# Patient Record
Sex: MALE | Race: WHITE | NOT HISPANIC OR LATINO | Employment: OTHER | URBAN - NONMETROPOLITAN AREA
[De-identification: names, ages, dates, MRNs, and addresses within clinical notes are randomized per-mention and may not be internally consistent; named-entity substitution may affect disease eponyms.]

---

## 2017-03-08 ENCOUNTER — HOSPITAL ENCOUNTER (OUTPATIENT)
Dept: GENERAL RADIOLOGY | Facility: HOSPITAL | Age: 57
Discharge: HOME | End: 2017-03-08
Admitting: PHYSICIAN ASSISTANT

## 2017-03-08 ENCOUNTER — TRANSCRIBE ORDERS (OUTPATIENT)
Dept: ADMINISTRATIVE | Facility: HOSPITAL | Age: 57
End: 2017-03-08

## 2017-03-08 DIAGNOSIS — R05.9 COUGH: Primary | ICD-10-CM

## 2017-03-08 PROCEDURE — 71020 HC CHEST PA AND LATERAL: CPT

## 2018-05-10 ENCOUNTER — TRANSCRIBE ORDERS (OUTPATIENT)
Dept: ADMINISTRATIVE | Facility: HOSPITAL | Age: 58
End: 2018-05-10

## 2018-05-10 DIAGNOSIS — R53.83 OTHER FATIGUE: Primary | ICD-10-CM

## 2018-05-10 DIAGNOSIS — R06.83 SNORING: ICD-10-CM

## 2018-06-13 ENCOUNTER — HOSPITAL ENCOUNTER (OUTPATIENT)
Dept: SLEEP MEDICINE | Facility: HOSPITAL | Age: 58
Discharge: HOME | End: 2018-06-13
Attending: INTERNAL MEDICINE | Admitting: INTERNAL MEDICINE

## 2018-06-13 VITALS
HEIGHT: 69 IN | RESPIRATION RATE: 16 BRPM | SYSTOLIC BLOOD PRESSURE: 149 MMHG | DIASTOLIC BLOOD PRESSURE: 69 MMHG | WEIGHT: 208 LBS | OXYGEN SATURATION: 97 % | HEART RATE: 72 BPM | BODY MASS INDEX: 30.81 KG/M2

## 2018-06-13 DIAGNOSIS — R06.83 SNORING: ICD-10-CM

## 2018-06-13 DIAGNOSIS — R53.83 OTHER FATIGUE: ICD-10-CM

## 2018-06-13 PROCEDURE — 95810 POLYSOM 6/> YRS 4/> PARAM: CPT | Performed by: PSYCHIATRY & NEUROLOGY

## 2018-06-13 PROCEDURE — 95810 POLYSOM 6/> YRS 4/> PARAM: CPT

## 2018-06-13 RX ORDER — MELOXICAM 15 MG/1
15 TABLET ORAL DAILY
COMMUNITY

## 2018-06-13 RX ORDER — LISINOPRIL 10 MG/1
10 TABLET ORAL DAILY
COMMUNITY

## 2018-06-13 RX ORDER — CHLORAL HYDRATE 500 MG
CAPSULE ORAL
COMMUNITY

## 2018-06-13 RX ORDER — FENOFIBRATE 145 MG/1
145 TABLET, COATED ORAL DAILY
COMMUNITY

## 2018-06-13 RX ORDER — INSULIN GLARGINE 100 [IU]/ML
INJECTION, SOLUTION SUBCUTANEOUS DAILY
COMMUNITY
End: 2019-07-11 | Stop reason: ALTCHOICE

## 2018-06-25 ENCOUNTER — HOSPITAL ENCOUNTER (OUTPATIENT)
Dept: SLEEP MEDICINE | Facility: HOSPITAL | Age: 58
Discharge: HOME | End: 2018-06-25
Admitting: PSYCHIATRY & NEUROLOGY

## 2018-06-25 ENCOUNTER — TRANSCRIBE ORDERS (OUTPATIENT)
Dept: SLEEP MEDICINE | Facility: HOSPITAL | Age: 58
End: 2018-06-25

## 2018-06-25 VITALS
BODY MASS INDEX: 30.79 KG/M2 | SYSTOLIC BLOOD PRESSURE: 140 MMHG | RESPIRATION RATE: 18 BRPM | DIASTOLIC BLOOD PRESSURE: 84 MMHG | WEIGHT: 207.89 LBS | HEART RATE: 92 BPM | HEIGHT: 69 IN | OXYGEN SATURATION: 96 %

## 2018-06-25 DIAGNOSIS — G47.33 OBSTRUCTIVE SLEEP APNEA, ADULT: Primary | ICD-10-CM

## 2018-06-25 DIAGNOSIS — G47.33 OBSTRUCTIVE SLEEP APNEA, ADULT: ICD-10-CM

## 2018-06-25 PROCEDURE — 95811 POLYSOM 6/>YRS CPAP 4/> PARM: CPT | Performed by: PSYCHIATRY & NEUROLOGY

## 2018-06-25 PROCEDURE — 95811 POLYSOM 6/>YRS CPAP 4/> PARM: CPT

## 2019-02-13 ENCOUNTER — TRANSCRIBE ORDERS (OUTPATIENT)
Dept: ADMINISTRATIVE | Facility: HOSPITAL | Age: 59
End: 2019-02-13

## 2019-02-13 ENCOUNTER — HOSPITAL ENCOUNTER (OUTPATIENT)
Dept: GENERAL RADIOLOGY | Facility: HOSPITAL | Age: 59
Discharge: HOME | End: 2019-02-13
Admitting: INTERNAL MEDICINE

## 2019-02-13 DIAGNOSIS — M25.511 RIGHT SHOULDER PAIN, UNSPECIFIED CHRONICITY: Primary | ICD-10-CM

## 2019-02-13 PROCEDURE — 73030 X-RAY EXAM OF SHOULDER: CPT

## 2019-02-14 ENCOUNTER — HOSPITAL ENCOUNTER (OUTPATIENT)
Dept: MRI IMAGING | Facility: HOSPITAL | Age: 59
Discharge: HOME | End: 2019-02-14
Admitting: PHYSICIAN ASSISTANT

## 2019-02-14 ENCOUNTER — TRANSCRIBE ORDERS (OUTPATIENT)
Dept: ADMINISTRATIVE | Facility: HOSPITAL | Age: 59
End: 2019-02-14

## 2019-02-14 DIAGNOSIS — M25.511 RIGHT SHOULDER PAIN, UNSPECIFIED CHRONICITY: Primary | ICD-10-CM

## 2019-02-14 PROCEDURE — 73221 MRI JOINT UPR EXTREM W/O DYE: CPT

## 2019-07-11 ENCOUNTER — OFFICE VISIT (OUTPATIENT)
Dept: GASTROENTEROLOGY | Facility: CLINIC | Age: 59
End: 2019-07-11

## 2019-07-11 VITALS
HEART RATE: 85 BPM | DIASTOLIC BLOOD PRESSURE: 72 MMHG | BODY MASS INDEX: 29.78 KG/M2 | HEIGHT: 70 IN | SYSTOLIC BLOOD PRESSURE: 128 MMHG | OXYGEN SATURATION: 97 % | WEIGHT: 208 LBS

## 2019-07-11 DIAGNOSIS — Z80.0 FAMILY HX OF COLON CANCER: ICD-10-CM

## 2019-07-11 DIAGNOSIS — E11.9 CONTROLLED TYPE 2 DIABETES MELLITUS WITHOUT COMPLICATION, WITHOUT LONG-TERM CURRENT USE OF INSULIN (HCC): ICD-10-CM

## 2019-07-11 DIAGNOSIS — I10 HTN (HYPERTENSION), BENIGN: ICD-10-CM

## 2019-07-11 DIAGNOSIS — Z86.010 HX OF COLONIC POLYP: Primary | ICD-10-CM

## 2019-07-11 PROBLEM — Z86.0100 HX OF COLONIC POLYP: Status: ACTIVE | Noted: 2019-07-11

## 2019-07-11 PROCEDURE — S0285 CNSLT BEFORE SCREEN COLONOSC: HCPCS | Performed by: CLINICAL NURSE SPECIALIST

## 2019-07-11 RX ORDER — SODIUM, POTASSIUM,MAG SULFATES 17.5-3.13G
SOLUTION, RECONSTITUTED, ORAL ORAL
Qty: 2 BOTTLE | Refills: 0 | Status: ON HOLD | OUTPATIENT
Start: 2019-07-11 | End: 2019-10-22

## 2019-07-11 NOTE — PROGRESS NOTES
Juan Antonio Gomes  1960 7/11/2019  Chief Complaint   Patient presents with   • Colonoscopy     Subjective   HPI  Juan Antonio Gomes is a 59 y.o. male who presents as a referral for preventative maintenance. He has no complaints of nausea or vomiting. No change in bowels. No wt loss. No BRBPR. No melena. There is positive family hx for colon cancer. No abdominal pain.  Past Medical History:   Diagnosis Date   • Diabetes mellitus (CMS/HCC)    • Hx of colonic polyp    • Hyperlipidemia      Past Surgical History:   Procedure Laterality Date   • ACHILLES TENDON SURGERY     • COLONOSCOPY  08/15/2014    Normal exam repeat in 5 years   • COLONOSCOPY W/ POLYPECTOMY  08/29/2011    Hyperplastic polyp at 60 cm repeat exam in 3 years   • REFRACTIVE SURGERY     • VASECTOMY       Outpatient Medications Marked as Taking for the 7/11/19 encounter (Office Visit) with Jessica Lai APRN   Medication Sig Dispense Refill   • dapagliflozin (FARXIGA) 5 MG tablet tablet Take 5 mg by mouth Daily.     • Dulaglutide (TRULICITY) 1.5 MG/0.5ML solution pen-injector Inject  under the skin into the appropriate area as directed. weekly     • fenofibrate (TRICOR) 145 MG tablet Take 145 mg by mouth Daily.     • insulin detemir (LEVEMIR) 100 UNIT/ML injection Inject 42 Units under the skin into the appropriate area as directed Daily.     • lisinopril (PRINIVIL,ZESTRIL) 10 MG tablet Take 10 mg by mouth Daily.     • meloxicam (MOBIC) 15 MG tablet Take 15 mg by mouth Daily.     • metFORMIN (GLUCOPHAGE) 500 MG tablet Take 500 mg by mouth 4 (Four) Times a Day With Meals & at Bedtime.     • Omega-3 Fatty Acids (FISH OIL) 1000 MG capsule capsule Take  by mouth Daily With Breakfast.       No Known Allergies  Social History     Socioeconomic History   • Marital status:      Spouse name: Not on file   • Number of children: Not on file   • Years of education: Not on file   • Highest education level: Not on file   Tobacco Use   • Smoking  "status: Former Smoker   • Smokeless tobacco: Never Used   Substance and Sexual Activity   • Alcohol use: Yes     Comment: Occasional     Family History   Problem Relation Age of Onset   • Colon cancer Father    • Colon polyps Neg Hx      Health Maintenance   Topic Date Due   • URINE MICROALBUMIN  1960   • ANNUAL PHYSICAL  05/05/1961   • PNEUMOCOCCAL VACCINE (19-64 MEDIUM RISK) (1 of 1 - PPSV23) 05/05/1977   • TDAP/TD VACCINES (1 - Tdap) 05/05/1977   • ZOSTER VACCINE (1 of 2) 05/05/2008   • HEPATITIS C SCREENING  04/30/2019   • DIABETIC FOOT EXAM  04/30/2019   • DIABETIC EYE EXAM  04/30/2019   • LIPID PANEL  07/11/2019   • INFLUENZA VACCINE  08/01/2019   • HEMOGLOBIN A1C  11/15/2019   • COLONOSCOPY  08/15/2024       REVIEW OF SYSTEMS  General: well appearing, no fever chills or sweats, no unexplained wt loss  HEENT: no acute visual or hearing disturbances  Cardiovascular: No chest pain or palpitations  Pulmonary: No shortness of breath, coughing, wheezing or hemoptysis  : No burning, urgency, hematuria, or dysuria  Musculoskeletal: No joint pain or stiffness  Peripheral: no edema  Skin: No lesions or rashes  Neuro: No dizziness, headaches, stroke, syncope  Endocrine: No hot or cold intolerances  Hematological: No blood dyscrasias    Objective   Vitals:    07/11/19 1338   BP: 128/72   Pulse: 85   SpO2: 97%   Weight: 94.3 kg (208 lb)   Height: 177.8 cm (70\")     Body mass index is 29.84 kg/m².  Patient's Body mass index is 29.84 kg/m². BMI is above normal parameters. Recommendations include: nutrition counseling.      PHYSICAL EXAM  General: age appropriate well nourished well appearing, no acute distress  Head: normocephalic and atraumatic  Global assessment-supple  Neck-No JVD noted, no lymphadenopathy  Pulmonary-clear to auscultation bilaterally, normal respiratory effort  Cardiovascular-normal rate and rhythm, normal heart sounds, S1 and S2 noted  Abdomen-soft, non tender, non distended, normal bowel sounds " all 4 quadrants, no hepatosplenomegaly noted  Extremities-No clubbing cyanosis or edema  Neuro-Non focal, converses appropriately, awake, alert, oriented    Assessment/Plan     Juan Antonio was seen today for colonoscopy.    Diagnoses and all orders for this visit:    Hx of colonic polyp  -     Case Request; Standing  -     Follow Anesthesia Guidelines / Standing Orders; Future  -     Obtain Informed Consent; Future  -     Implement Anesthesia Orders Day of Procedure; Standing  -     Obtain Informed Consent; Standing  -     Verify bowel prep was successful; Standing  -     Case Request  -     SUPREP BOWEL PREP KIT 17.5-3.13-1.6 GM/177ML solution oral solution; Take as directed by office instructions provided    Controlled type 2 diabetes mellitus without complication, without long-term current use of insulin (CMS/[facility])  Comments:  Hold Metformin the am of procedure    HTN (hypertension), benign  Comments:  cont BP medication the day of procedure    Family hx of colon cancer        COLONOSCOPY WITH ANESTHESIA (N/A)  Body mass index is 29.84 kg/m².    Patient instructions on prep prior to procedure provided to the patient.    All risks, benefits, alternatives, and indications of colonoscopy procedure have been discussed with the patient. Risks to include perforation of the colon requiring possible surgery or colostomy, risk of bleeding from biopsies or removal of colon tissue, possibility of missing a colon polyp or cancer, or adverse drug reaction.  Benefits to include the diagnosis and management of disease of the colon and rectum. Alternatives to include barium enema, radiographic evaluation, lab testing or no intervention. Pt verbalizes understanding and agrees.     Jessica Lai, APRN  2019  1:57 PM      IF YOU SMOKE OR USE TOBACCO PLEASE READ THE FOLLOWIN minutes reading provided    Why is smoking bad for me?  Smoking increases the risk of heart disease, lung disease, vascular disease, stroke, and  cancer.     If you smoke, STOP!    If you would like more information on quitting smoking, please visit the [employer] website: [email or web address]   This link will provide additional resources including the QUIT line and the [employer] the Pack support groups.     For more information:    Quit Now [place]  1-800-QUIT-NOW  https://[email or web address].org/en-[place]/    Obesity, Adult  Obesity is the condition of having too much total body fat. Being overweight or obese means that your weight is greater than what is considered healthy for your body size. Obesity is determined by a measurement called BMI. BMI is an estimate of body fat and is calculated from height and weight. For adults, a BMI of 30 or higher is considered obese.  Obesity can eventually lead to other health concerns and major illnesses, including:  · Stroke.  · Coronary artery disease (CAD).  · Type 2 diabetes.  · Some types of cancer, including cancers of the colon, breast, uterus, and gallbladder.  · Osteoarthritis.  · High blood pressure (hypertension).  · High cholesterol.  · Sleep apnea.  · Gallbladder stones.  · Infertility problems.  What are the causes?  The main cause of obesity is taking in (consuming) more calories than your body uses for energy. Other factors that contribute to this condition may include:  · Being born with genes that make you more likely to become obese.  · Having a medical condition that causes obesity. These conditions include:  ¨ Hypothyroidism.  ¨ Polycystic ovarian syndrome (PCOS).  ¨ Binge-eating disorder.  ¨ Cushing syndrome.  · Taking certain medicines, such as steroids, antidepressants, and seizure medicines.  · Not being physically active (sedentary lifestyle).  · Living where there are limited places to exercise safely or buy healthy foods.  · Not getting enough sleep.  What increases the risk?  The following factors may increase your risk of this condition:  · Having a  family history of obesity.  · Being a woman of -American descent.  · Being a man of  descent.  What are the signs or symptoms?  Having excessive body fat is the main symptom of this condition.  How is this diagnosed?  This condition may be diagnosed based on:  · Your symptoms.  · Your medical history.  · A physical exam. Your health care provider may measure:  ¨ Your BMI. If you are an adult with a BMI between 25 and less than 30, you are considered overweight. If you are an adult with a BMI of 30 or higher, you are considered obese.  ¨ The distances around your hips and your waist (circumferences). These may be compared to each other to help diagnose your condition.  ¨ Your skinfold thickness. Your health care provider may gently pinch a fold of your skin and measure it.  How is this treated?  Treatment for this condition often includes changing your lifestyle. Treatment may include some or all of the following:  · Dietary changes. Work with your health care provider and a dietitian to set a weight-loss goal that is healthy and reasonable for you. Dietary changes may include eating:  ¨ Smaller portions. A portion size is the amount of a particular food that is healthy for you to eat at one time. This varies from person to person.  ¨ Low-calorie or low-fat options.  ¨ More whole grains, fruits, and vegetables.  · Regular physical activity. This may include aerobic activity (cardio) and strength training.  · Medicine to help you lose weight. Your health care provider may prescribe medicine if you are unable to lose 1 pound a week after 6 weeks of eating more healthily and doing more physical activity.  · Surgery. Surgical options may include gastric banding and gastric bypass. Surgery may be done if:  ¨ Other treatments have not helped to improve your condition.  ¨ You have a BMI of 40 or higher.  ¨ You have life-threatening health problems related to obesity.  Follow these instructions at home:      Eating and drinking     · Follow recommendations from your health care provider about what you eat and drink. Your health care provider may advise you to:  ¨ Limit fast foods, sweets, and processed snack foods.  ¨ Choose low-fat options, such as low-fat milk instead of whole milk.  ¨ Eat 5 or more servings of fruits or vegetables every day.  ¨ Eat at home more often. This gives you more control over what you eat.  ¨ Choose healthy foods when you eat out.  ¨ Learn what a healthy portion size is.  ¨ Keep low-fat snacks on hand.  ¨ Avoid sugary drinks, such as soda, fruit juice, iced tea sweetened with sugar, and flavored milk.  ¨ Eat a healthy breakfast.  · Drink enough water to keep your urine clear or pale yellow.  · Do not go without eating for long periods of time (do not fast) or follow a fad diet. Fasting and fad diets can be unhealthy and even dangerous.  Physical Activity   · Exercise regularly, as told by your health care provider. Ask your health care provider what types of exercise are safe for you and how often you should exercise.  · Warm up and stretch before being active.  · Cool down and stretch after being active.  · Rest between periods of activity.  Lifestyle   · Limit the time that you spend in front of your TV, computer, or video game system.  · Find ways to reward yourself that do not involve food.  · Limit alcohol intake to no more than 1 drink a day for nonpregnant women and 2 drinks a day for men. One drink equals 12 oz of beer, 5 oz of wine, or 1½ oz of hard liquor.  General instructions   · Keep a weight loss journal to keep track of the food you eat and how much you exercise you get.  · Take over-the-counter and prescription medicines only as told by your health care provider.  · Take vitamins and supplements only as told by your health care provider.  · Consider joining a support group. Your health care provider may be able to recommend a support group.  · Keep all follow-up visits as  told by your health care provider. This is important.  Contact a health care provider if:  · You are unable to meet your weight loss goal after 6 weeks of dietary and lifestyle changes.  This information is not intended to replace advice given to you by your health care provider. Make sure you discuss any questions you have with your health care provider.  Document Released: 01/25/2006 Document Revised: 05/22/2017 Document Reviewed: 10/05/2016  [employer] Interactive Patient Education © 2017 [employer]

## 2019-10-22 ENCOUNTER — ANESTHESIA (OUTPATIENT)
Dept: GASTROENTEROLOGY | Facility: HOSPITAL | Age: 59
End: 2019-10-22

## 2019-10-22 ENCOUNTER — ANESTHESIA EVENT (OUTPATIENT)
Dept: GASTROENTEROLOGY | Facility: HOSPITAL | Age: 59
End: 2019-10-22

## 2019-10-22 ENCOUNTER — HOSPITAL ENCOUNTER (OUTPATIENT)
Facility: HOSPITAL | Age: 59
Setting detail: HOSPITAL OUTPATIENT SURGERY
Discharge: HOME | End: 2019-10-22
Attending: INTERNAL MEDICINE | Admitting: INTERNAL MEDICINE

## 2019-10-22 VITALS
HEART RATE: 85 BPM | BODY MASS INDEX: 28.06 KG/M2 | WEIGHT: 196 LBS | DIASTOLIC BLOOD PRESSURE: 69 MMHG | RESPIRATION RATE: 18 BRPM | HEIGHT: 70 IN | TEMPERATURE: 96.7 F | OXYGEN SATURATION: 94 % | SYSTOLIC BLOOD PRESSURE: 135 MMHG

## 2019-10-22 DIAGNOSIS — Z86.010 HX OF COLONIC POLYP: ICD-10-CM

## 2019-10-22 LAB — GLUCOSE BLDC GLUCOMTR-MCNC: 121 MG/DL (ref 70–130)

## 2019-10-22 PROCEDURE — 93010 ELECTROCARDIOGRAM REPORT: CPT | Performed by: INTERNAL MEDICINE

## 2019-10-22 PROCEDURE — 93005 ELECTROCARDIOGRAM TRACING: CPT | Performed by: NURSE ANESTHETIST, CERTIFIED REGISTERED

## 2019-10-22 PROCEDURE — 82962 GLUCOSE BLOOD TEST: CPT

## 2019-10-22 PROCEDURE — 45385 COLONOSCOPY W/LESION REMOVAL: CPT | Performed by: INTERNAL MEDICINE

## 2019-10-22 PROCEDURE — 25010000002 PROPOFOL 10 MG/ML EMULSION: Performed by: NURSE ANESTHETIST, CERTIFIED REGISTERED

## 2019-10-22 PROCEDURE — 88305 TISSUE EXAM BY PATHOLOGIST: CPT | Performed by: INTERNAL MEDICINE

## 2019-10-22 RX ORDER — SODIUM CHLORIDE 9 MG/ML
500 INJECTION, SOLUTION INTRAVENOUS CONTINUOUS PRN
Status: DISCONTINUED | OUTPATIENT
Start: 2019-10-22 | End: 2019-10-22 | Stop reason: HOSPADM

## 2019-10-22 RX ORDER — SODIUM CHLORIDE 0.9 % (FLUSH) 0.9 %
10 SYRINGE (ML) INJECTION AS NEEDED
Status: DISCONTINUED | OUTPATIENT
Start: 2019-10-22 | End: 2019-10-22 | Stop reason: HOSPADM

## 2019-10-22 RX ORDER — PROPOFOL 10 MG/ML
VIAL (ML) INTRAVENOUS AS NEEDED
Status: DISCONTINUED | OUTPATIENT
Start: 2019-10-22 | End: 2019-10-22 | Stop reason: SURG

## 2019-10-22 RX ADMIN — SODIUM CHLORIDE 500 ML: 9 INJECTION, SOLUTION INTRAVENOUS at 07:19

## 2019-10-22 RX ADMIN — PROPOFOL 50 MG: 10 INJECTION, EMULSION INTRAVENOUS at 07:51

## 2019-10-22 RX ADMIN — PROPOFOL 50 MG: 10 INJECTION, EMULSION INTRAVENOUS at 07:55

## 2019-10-22 RX ADMIN — LIDOCAINE HYDROCHLORIDE 100 MG: 20 INJECTION, SOLUTION INTRAVENOUS at 07:47

## 2019-10-22 RX ADMIN — LIDOCAINE HYDROCHLORIDE 100 MG: 20 INJECTION, SOLUTION INTRAVENOUS at 07:48

## 2019-10-22 RX ADMIN — PROPOFOL 100 MG: 10 INJECTION, EMULSION INTRAVENOUS at 07:47

## 2019-10-22 NOTE — H&P
[facility] Gastroenterology  Pre Procedure History & Physical    Chief Complaint:   History of polyps    Subjective     HPI:   Here for colonoscopy.  History of polyps    Past Medical History:   Past Medical History:   Diagnosis Date   • Diabetes mellitus (CMS/HCC)    • Hx of colonic polyp    • Hyperlipidemia    • Hypertension        Past Surgical History:  Past Surgical History:   Procedure Laterality Date   • ACHILLES TENDON SURGERY     • COLONOSCOPY  08/15/2014    Normal exam repeat in 5 years   • COLONOSCOPY W/ POLYPECTOMY  08/29/2011    Hyperplastic polyp at 60 cm repeat exam in 3 years   • REFRACTIVE SURGERY     • VASECTOMY         Family History:  Family History   Problem Relation Age of Onset   • Colon cancer Father    • Colon polyps Neg Hx        Social History:   reports that he has quit smoking. He has never used smokeless tobacco. He reports that he drinks alcohol.    Medications:   Prior to Admission medications    Medication Sig Start Date End Date Taking? Authorizing Provider   dapagliflozin (FARXIGA) 5 MG tablet tablet Take 5 mg by mouth Daily.   Yes Zak Broussard MD   fenofibrate (TRICOR) 145 MG tablet Take 145 mg by mouth Daily.   Yes Zak Broussard MD   lisinopril (PRINIVIL,ZESTRIL) 10 MG tablet Take 10 mg by mouth Daily.   Yes Zak Broussard MD   meloxicam (MOBIC) 15 MG tablet Take 15 mg by mouth Daily.   Yes Zak Broussard MD   metFORMIN (GLUCOPHAGE) 500 MG tablet Take 500 mg by mouth 4 (Four) Times a Day With Meals & at Bedtime.   Yes Zak Broussard MD   Dulaglutide (TRULICITY) 1.5 MG/0.5ML solution pen-injector Inject  under the skin into the appropriate area as directed. weekly    Zak Broussard MD   insulin detemir (LEVEMIR) 100 UNIT/ML injection Inject 42 Units under the skin into the appropriate area as directed Daily.    Zak Broussard MD   Omega-3 Fatty Acids (FISH OIL) 1000 MG capsule capsule Take  by mouth Daily With Breakfast.     "Provider, MD Zak   SUPREP BOWEL PREP KIT 17.5-3.13-1.6 GM/177ML solution oral solution Take as directed by office instructions provided 7/11/19 10/22/19  Jessica Lai APRN       Allergies:  Penicillins    Objective     Blood pressure 171/53, pulse 50, temperature 96.7 °F (35.9 °C), temperature source Temporal, resp. rate 18, height 177.8 cm (70\"), weight 88.9 kg (196 lb), SpO2 99 %.    Physical Exam   Constitutional: Pt is oriented to person, place, and in no distress.   HENT: Mouth/Throat: Oropharynx is clear.   Cardiovascular: Normal rate, regular rhythm.    Pulmonary/Chest: Effort normal. No respiratory distress. No  wheezes.   Abdominal: Soft. Non-distended.  Skin: Skin is warm and dry.   Psychiatric: Mood, memory, affect and judgment appear normal.     Assessment/Plan     Diagnosis:  History of polyps    Anticipated Surgical Procedure:    Proceed with colonoscopy as scheduled    The following major R/B/A were discussed with the patient, however the list is not all inclusive . Risk:  Bleeding (immediate and delayed), perforation (rupture or tear), reaction to medication, missed lesion/cancer, pain during the procedure, infection, need for surgery, need for ostomy, need for mechanical ventilation (breathing machine), death.  Benefits: removal of polyp/tissue, burn/clip/or inject to stop bleeding, removal of foreign body, dilate any stricture.  Alternatives: Xray or CT, surgery, do nothing with associated risk   The patient was given time to ask question and received explanation, and agrees to proceed as per History and Physical.   No guarantee given or expressed.    EMR Dragon/transcription disclaimer: Much of this encounter note is an electronic transcription/translation of spoken language to printed text.  The electronic translation of spoken language may permit erroneous, or at times, nonsensical words or phrases to be inadvertently transcribed.  Although I have reviewed the note for such " errors, some may still exist.    Regulo Mandel MD  7:45 AM  10/22/2019

## 2019-10-22 NOTE — ANESTHESIA PREPROCEDURE EVALUATION
Anesthesia Evaluation     no history of anesthetic complications:  NPO Solid Status: > 8 hours  NPO Liquid Status: > 2 hours           Airway   Mallampati: I  TM distance: >3 FB  Neck ROM: full  No difficulty expected  Dental      Pulmonary    (+) sleep apnea on CPAP,   (-) asthma, not a smoker  Cardiovascular   Exercise tolerance: good (4-7 METS)    (+) hypertension, hyperlipidemia,       Neuro/Psych  (-) seizures, TIA, CVA  GI/Hepatic/Renal/Endo    (+)   diabetes mellitus type 2 using insulin,   (-) liver disease, no renal disease    Musculoskeletal     Abdominal    Substance History      OB/GYN          Other                        Anesthesia Plan    ASA 3     MAC     intravenous induction   Anesthetic plan, all risks, benefits, and alternatives have been provided, discussed and informed consent has been obtained with: patient.

## 2019-10-22 NOTE — ANESTHESIA POSTPROCEDURE EVALUATION
Patient: Juan Antonio Gomes    Procedure Summary     Date:  10/22/19 Room / Location:  [facility] ENDOSCOPY 5 / [facility]    Anesthesia Start:  0745 Anesthesia Stop:  0807    Procedure:  COLONOSCOPY WITH ANESTHESIA (N/A ) Diagnosis:       Hx of colonic polyp      (Hx of colonic polyp [Z86.010])    Surgeon:  Regulo Mandel MD Provider:  MADIHA Vides CRNA    Anesthesia Type:  MAC ASA Status:  3          Anesthesia Type: MAC  Last vitals  BP   171/53 (10/22/19 0656)   Temp   96.7 °F (35.9 °C) (10/22/19 0656)   Pulse   50 (10/22/19 0656)   Resp   18 (10/22/19 0656)     SpO2   99 % (10/22/19 0656)     Post Anesthesia Care and Evaluation    Patient location during evaluation: PACU  Patient participation: complete - patient participated  Level of consciousness: awake and alert  Pain score: 0  Pain management: adequate  Airway patency: patent  Anesthetic complications: No anesthetic complications    Cardiovascular status: acceptable and stable  Respiratory status: acceptable and unassisted  Hydration status: acceptable

## 2019-10-23 LAB
CYTO UR: NORMAL
LAB AP CASE REPORT: NORMAL
PATH REPORT.FINAL DX SPEC: NORMAL
PATH REPORT.GROSS SPEC: NORMAL

## 2019-11-20 ENCOUNTER — HOSPITAL ENCOUNTER (OUTPATIENT)
Dept: NON INVASIVE DIAGNOSTICS | Age: 59
Discharge: HOME | End: 2019-11-20
Payer: COMMERCIAL

## 2019-11-20 PROCEDURE — 93227 XTRNL ECG REC<48 HR R&I: CPT | Performed by: INTERNAL MEDICINE

## 2019-11-20 PROCEDURE — 93225 XTRNL ECG REC<48 HRS REC: CPT

## 2019-11-20 PROCEDURE — 93226 XTRNL ECG REC<48 HR SCAN A/R: CPT

## 2021-03-15 ENCOUNTER — APPOINTMENT (OUTPATIENT)
Dept: VACCINE CLINIC | Facility: HOSPITAL | Age: 61
End: 2021-03-15

## 2023-03-17 ENCOUNTER — TRANSCRIBE ORDERS (OUTPATIENT)
Dept: ADMINISTRATIVE | Facility: HOSPITAL | Age: 63
End: 2023-03-17
Payer: COMMERCIAL

## 2023-03-17 DIAGNOSIS — M79.606 PAIN OF LOWER EXTREMITY, UNSPECIFIED LATERALITY: Primary | ICD-10-CM

## 2024-10-22 ENCOUNTER — OFFICE VISIT (OUTPATIENT)
Dept: GASTROENTEROLOGY | Facility: CLINIC | Age: 64
End: 2024-10-22
Payer: MEDICARE

## 2024-10-22 VITALS
OXYGEN SATURATION: 96 % | HEART RATE: 84 BPM | TEMPERATURE: 97.7 F | DIASTOLIC BLOOD PRESSURE: 70 MMHG | HEIGHT: 70 IN | WEIGHT: 198 LBS | SYSTOLIC BLOOD PRESSURE: 130 MMHG | BODY MASS INDEX: 28.35 KG/M2

## 2024-10-22 DIAGNOSIS — Z78.9 NONSMOKER: ICD-10-CM

## 2024-10-22 DIAGNOSIS — Z86.0101 HX OF ADENOMATOUS COLONIC POLYPS: Primary | ICD-10-CM

## 2024-10-22 DIAGNOSIS — Z86.0102 PERSONAL HISTORY OF HYPERPLASTIC COLON POLYPS: ICD-10-CM

## 2024-10-22 DIAGNOSIS — E11.9 CONTROLLED TYPE 2 DIABETES MELLITUS WITHOUT COMPLICATION, WITHOUT LONG-TERM CURRENT USE OF INSULIN: ICD-10-CM

## 2024-10-22 DIAGNOSIS — I10 HTN (HYPERTENSION), BENIGN: ICD-10-CM

## 2024-10-22 DIAGNOSIS — R19.4 CHANGE IN BOWEL HABITS: ICD-10-CM

## 2024-10-22 PROCEDURE — 3078F DIAST BP <80 MM HG: CPT | Performed by: CLINICAL NURSE SPECIALIST

## 2024-10-22 PROCEDURE — 1159F MED LIST DOCD IN RCRD: CPT | Performed by: CLINICAL NURSE SPECIALIST

## 2024-10-22 PROCEDURE — 99203 OFFICE O/P NEW LOW 30 MIN: CPT | Performed by: CLINICAL NURSE SPECIALIST

## 2024-10-22 PROCEDURE — 3075F SYST BP GE 130 - 139MM HG: CPT | Performed by: CLINICAL NURSE SPECIALIST

## 2024-10-22 PROCEDURE — 1160F RVW MEDS BY RX/DR IN RCRD: CPT | Performed by: CLINICAL NURSE SPECIALIST

## 2024-10-22 RX ORDER — METOPROLOL SUCCINATE 50 MG/1
1 TABLET, EXTENDED RELEASE ORAL
COMMUNITY

## 2024-10-22 RX ORDER — SODIUM, POTASSIUM,MAG SULFATES 17.5-3.13G
SOLUTION, RECONSTITUTED, ORAL ORAL
Qty: 177 ML | Refills: 0 | Status: SHIPPED | OUTPATIENT
Start: 2024-10-22

## 2024-10-22 RX ORDER — INSULIN GLARGINE 300 U/ML
INJECTION, SOLUTION SUBCUTANEOUS
COMMUNITY

## 2024-10-22 RX ORDER — DOXEPIN HYDROCHLORIDE 25 MG/1
25 CAPSULE ORAL NIGHTLY
COMMUNITY

## 2024-10-22 RX ORDER — ATORVASTATIN CALCIUM 10 MG/1
1 TABLET, FILM COATED ORAL
COMMUNITY

## 2024-10-22 RX ORDER — HYDROCHLOROTHIAZIDE 12.5 MG/1
1 TABLET ORAL DAILY
COMMUNITY

## 2024-10-22 NOTE — PROGRESS NOTES
Juan Antonio Gomes  1960      10/22/2024  Chief Complaint   Patient presents with    GI Problem     Change in bowel-Colon recall-hx of polyps     Subjective   HPI  Juan Antonio Gomes is a 64 y.o. male who presents as a referral for preventative maintenance and change in bowels. He has no complaints of nausea or vomiting. He says he has started taking dulcolax on occasion. He says he does not feel he is emptying the way he normally does over the past few months. No certain triggers. No alleviating factors. He is on fiber. No wt loss. No BRBPR. No melena. There is positive family hx for colon cancer. No abdominal pain.    Past Medical History:   Diagnosis Date    Diabetes mellitus     Hx of colonic polyp     Hyperlipidemia     Hypertension      Past Surgical History:   Procedure Laterality Date    ACHILLES TENDON SURGERY      COLONOSCOPY  08/15/2014    Normal exam repeat in 5 years    COLONOSCOPY N/A 10/22/2019    Dr. teixeira-One 5 mm hyperplastic polyp at 70 cm proximal to the anus, One 4 mm tubular adenoma polyp at 60 cm proximal to the anus, One 4 mm hyperplastic polyp at 50 cm proximal to the anus    COLONOSCOPY W/ POLYPECTOMY  08/29/2011    Hyperplastic polyp at 60 cm repeat exam in 3 years    REFRACTIVE SURGERY      VASECTOMY       Outpatient Medications Marked as Taking for the 10/22/24 encounter (Office Visit) with Jessica Lai APRN   Medication Sig Dispense Refill    ASPIRIN 81 PO Take 1 tablet by mouth Daily.      atorvastatin (LIPITOR) 10 MG tablet Take 1 tablet by mouth every night at bedtime.      dapagliflozin (FARXIGA) 5 MG tablet tablet Take 1 tablet by mouth Daily.      doxepin (SINEquan) 25 MG capsule Take 1 capsule by mouth Every Night.      Dulaglutide (TRULICITY) 1.5 MG/0.5ML solution pen-injector Inject  under the skin into the appropriate area as directed. weekly      fenofibrate (TRICOR) 145 MG tablet Take 1 tablet by mouth Daily.      hydroCHLOROthiazide 12.5 MG tablet Take 1  tablet by mouth Daily.      lisinopril (PRINIVIL,ZESTRIL) 10 MG tablet Take 1 tablet by mouth Daily.      meloxicam (MOBIC) 15 MG tablet Take 1 tablet by mouth Daily.      metFORMIN (GLUCOPHAGE) 500 MG tablet Take 1 tablet by mouth 4 (Four) Times a Day With Meals & at Bedtime.      metoprolol succinate XL (TOPROL-XL) 50 MG 24 hr tablet Take 1 tablet by mouth every night at bedtime.      Omega-3 Fatty Acids (FISH OIL) 1000 MG capsule capsule Take  by mouth Daily With Breakfast.      Toujeo Max SoloStar 300 UNIT/ML solution pen-injector injection INJECT 42 UNITS SUBCUTANEOUSLY ONCE DAILY       Allergies   Allergen Reactions    Penicillins Unknown (See Comments)     childhood     Social History     Socioeconomic History    Marital status:    Tobacco Use    Smoking status: Former    Smokeless tobacco: Never   Substance and Sexual Activity    Alcohol use: Yes     Comment: Occasional    Drug use: Never     Family History   Problem Relation Age of Onset    Colon cancer Father     Colon polyps Neg Hx      Health Maintenance   Topic Date Due    URINE MICROALBUMIN  Never done    Pneumococcal Vaccine 65+ (1 of 2 - PCV) Never done    DIABETIC EYE EXAM  Never done    LIPID PANEL  03/11/2016    HEPATITIS C SCREENING  Never done    ANNUAL WELLNESS VISIT  Never done    DIABETIC FOOT EXAM  Never done    BMI FOLLOWUP  07/11/2020    HEMOGLOBIN A1C  08/21/2020    COLORECTAL CANCER SCREENING  10/22/2024    COVID-19 Vaccine (5 - 2023-24 season) 11/08/2024    TDAP/TD VACCINES (2 - Tdap) 09/09/2031    INFLUENZA VACCINE  Completed    ZOSTER VACCINE  Completed       REVIEW OF SYSTEMS  General: well appearing, no fever chills or sweats, no unexplained wt loss  HEENT: no acute visual or hearing disturbances  Cardiovascular: No chest pain or palpitations  Pulmonary: No shortness of breath, coughing, wheezing or hemoptysis  : No burning, urgency, hematuria, or dysuria  GI: more constipation, change in bowels.  Musculoskeletal: No joint  "pain or stiffness  Peripheral: no edema  Skin: No lesions or rashes  Neuro: No dizziness, headaches, stroke, syncope  Endocrine: No hot or cold intolerances  Hematological: No blood dyscrasias    Objective   Vitals:    10/22/24 1252   BP: 130/70   BP Location: Left arm   Pulse: 84   Temp: 97.7 °F (36.5 °C)   TempSrc: Temporal   SpO2: 96%   Weight: 89.8 kg (198 lb)   Height: 177.8 cm (70\")     Body mass index is 28.41 kg/m².  BMI cannot be calculated due to outdated height or weight values.  Please input a current height/weight in Vitals and re-renter BMIFOLLOWUP in Note to pull in correct documentation based on BMI range.      PHYSICAL EXAM  General: age appropriate well nourished well appearing, no acute distress  Head: normocephalic and atraumatic  Global assessment-supple  Neck-No JVD noted, no lymphadenopathy  Pulmonary-clear to auscultation bilaterally, normal respiratory effort  Cardiovascular-normal rate and rhythm, normal heart sounds, S1 and S2 noted  Abdomen-soft, non tender, non distended, normal bowel sounds all 4 quadrants, no hepatosplenomegaly noted  Extremities-No clubbing cyanosis or edema  Neuro-Non focal, converses appropriately, awake, alert, oriented    Assessment & Plan     Diagnoses and all orders for this visit:    1. Hx of adenomatous colonic polyps (Primary)  -     Case Request; Standing  -     Case Request  -     sodium-potassium-magnesium sulfates (Suprep Bowel Prep Kit) 17.5-3.13-1.6 GM/177ML solution oral solution; Take as directed by office instructions provided  Dispense: 177 mL; Refill: 0    2. Change in bowel habits    3. Personal history of hyperplastic colon polyps    4. Nonsmoker    5. HTN (hypertension), benign  Comments:  cont BP medication the day of procedure    6. Controlled type 2 diabetes mellitus without complication, without long-term current use of insulin    Other orders  -     Implement Anesthesia Orders Day of Procedure; Standing  -     Follow Anesthesia Guidelines / " Protocol; Future  -     Verify bowel prep was successful; Standing    Increase in water intake  Continue fiber intake  Miralax 17 grams as needed up to twice per day    COLONOSCOPY WITH ANESTHESIA (N/A)  Body mass index is 28.41 kg/m².    Patient instructions on prep prior to procedure provided to the patient.    All risks, benefits, alternatives, and indications of colonoscopy procedure have been discussed with the patient. Risks to include perforation of the colon requiring possible surgery or colostomy, risk of bleeding from biopsies or removal of colon tissue, possibility of missing a colon polyp or cancer, or adverse drug reaction.  Benefits to include the diagnosis and management of disease of the colon and rectum. Alternatives to include barium enema, radiographic evaluation, lab testing or no intervention. Pt verbalizes understanding and agrees.     Jessica Lai, APRN  10/22/2024  13:30 CDT      IF YOU SMOKE OR USE TOBACCO PLEASE READ THE FOLLOWIN minutes reading provided    Why is smoking bad for me?  Smoking increases the risk of heart disease, lung disease, vascular disease, stroke, and cancer.     If you smoke, STOP!    If you would like more information on quitting smoking, please visit the [employer] website: [email or web address]   This link will provide additional resources including the QUIT line and the Beat the Pack support groups.     For more information:    Quit Now [place]  -QUIT-NOW  [email or web address]

## 2024-10-22 NOTE — H&P (VIEW-ONLY)
Juan Antonio Gomes  1960      10/22/2024  Chief Complaint   Patient presents with    GI Problem     Change in bowel-Colon recall-hx of polyps     Subjective   HPI  Juan Antonio Gomes is a 64 y.o. male who presents as a referral for preventative maintenance and change in bowels. He has no complaints of nausea or vomiting. He says he has started taking dulcolax on occasion. He says he does not feel he is emptying the way he normally does over the past few months. No certain triggers. No alleviating factors. He is on fiber. No wt loss. No BRBPR. No melena. There is positive family hx for colon cancer. No abdominal pain.    Past Medical History:   Diagnosis Date    Diabetes mellitus     Hx of colonic polyp     Hyperlipidemia     Hypertension      Past Surgical History:   Procedure Laterality Date    ACHILLES TENDON SURGERY      COLONOSCOPY  08/15/2014    Normal exam repeat in 5 years    COLONOSCOPY N/A 10/22/2019    Dr. teixeira-One 5 mm hyperplastic polyp at 70 cm proximal to the anus, One 4 mm tubular adenoma polyp at 60 cm proximal to the anus, One 4 mm hyperplastic polyp at 50 cm proximal to the anus    COLONOSCOPY W/ POLYPECTOMY  08/29/2011    Hyperplastic polyp at 60 cm repeat exam in 3 years    REFRACTIVE SURGERY      VASECTOMY       Outpatient Medications Marked as Taking for the 10/22/24 encounter (Office Visit) with Jessica Lai APRN   Medication Sig Dispense Refill    ASPIRIN 81 PO Take 1 tablet by mouth Daily.      atorvastatin (LIPITOR) 10 MG tablet Take 1 tablet by mouth every night at bedtime.      dapagliflozin (FARXIGA) 5 MG tablet tablet Take 1 tablet by mouth Daily.      doxepin (SINEquan) 25 MG capsule Take 1 capsule by mouth Every Night.      Dulaglutide (TRULICITY) 1.5 MG/0.5ML solution pen-injector Inject  under the skin into the appropriate area as directed. weekly      fenofibrate (TRICOR) 145 MG tablet Take 1 tablet by mouth Daily.      hydroCHLOROthiazide 12.5 MG tablet Take 1  tablet by mouth Daily.      lisinopril (PRINIVIL,ZESTRIL) 10 MG tablet Take 1 tablet by mouth Daily.      meloxicam (MOBIC) 15 MG tablet Take 1 tablet by mouth Daily.      metFORMIN (GLUCOPHAGE) 500 MG tablet Take 1 tablet by mouth 4 (Four) Times a Day With Meals & at Bedtime.      metoprolol succinate XL (TOPROL-XL) 50 MG 24 hr tablet Take 1 tablet by mouth every night at bedtime.      Omega-3 Fatty Acids (FISH OIL) 1000 MG capsule capsule Take  by mouth Daily With Breakfast.      Toujeo Max SoloStar 300 UNIT/ML solution pen-injector injection INJECT 42 UNITS SUBCUTANEOUSLY ONCE DAILY       Allergies   Allergen Reactions    Penicillins Unknown (See Comments)     childhood     Social History     Socioeconomic History    Marital status:    Tobacco Use    Smoking status: Former    Smokeless tobacco: Never   Substance and Sexual Activity    Alcohol use: Yes     Comment: Occasional    Drug use: Never     Family History   Problem Relation Age of Onset    Colon cancer Father     Colon polyps Neg Hx      Health Maintenance   Topic Date Due    URINE MICROALBUMIN  Never done    Pneumococcal Vaccine 65+ (1 of 2 - PCV) Never done    DIABETIC EYE EXAM  Never done    LIPID PANEL  03/11/2016    HEPATITIS C SCREENING  Never done    ANNUAL WELLNESS VISIT  Never done    DIABETIC FOOT EXAM  Never done    BMI FOLLOWUP  07/11/2020    HEMOGLOBIN A1C  08/21/2020    COLORECTAL CANCER SCREENING  10/22/2024    COVID-19 Vaccine (5 - 2023-24 season) 11/08/2024    TDAP/TD VACCINES (2 - Tdap) 09/09/2031    INFLUENZA VACCINE  Completed    ZOSTER VACCINE  Completed       REVIEW OF SYSTEMS  General: well appearing, no fever chills or sweats, no unexplained wt loss  HEENT: no acute visual or hearing disturbances  Cardiovascular: No chest pain or palpitations  Pulmonary: No shortness of breath, coughing, wheezing or hemoptysis  : No burning, urgency, hematuria, or dysuria  GI: more constipation, change in bowels.  Musculoskeletal: No joint  "pain or stiffness  Peripheral: no edema  Skin: No lesions or rashes  Neuro: No dizziness, headaches, stroke, syncope  Endocrine: No hot or cold intolerances  Hematological: No blood dyscrasias    Objective   Vitals:    10/22/24 1252   BP: 130/70   BP Location: Left arm   Pulse: 84   Temp: 97.7 °F (36.5 °C)   TempSrc: Temporal   SpO2: 96%   Weight: 89.8 kg (198 lb)   Height: 177.8 cm (70\")     Body mass index is 28.41 kg/m².  BMI cannot be calculated due to outdated height or weight values.  Please input a current height/weight in Vitals and re-renter BMIFOLLOWUP in Note to pull in correct documentation based on BMI range.      PHYSICAL EXAM  General: age appropriate well nourished well appearing, no acute distress  Head: normocephalic and atraumatic  Global assessment-supple  Neck-No JVD noted, no lymphadenopathy  Pulmonary-clear to auscultation bilaterally, normal respiratory effort  Cardiovascular-normal rate and rhythm, normal heart sounds, S1 and S2 noted  Abdomen-soft, non tender, non distended, normal bowel sounds all 4 quadrants, no hepatosplenomegaly noted  Extremities-No clubbing cyanosis or edema  Neuro-Non focal, converses appropriately, awake, alert, oriented    Assessment & Plan     Diagnoses and all orders for this visit:    1. Hx of adenomatous colonic polyps (Primary)  -     Case Request; Standing  -     Case Request  -     sodium-potassium-magnesium sulfates (Suprep Bowel Prep Kit) 17.5-3.13-1.6 GM/177ML solution oral solution; Take as directed by office instructions provided  Dispense: 177 mL; Refill: 0    2. Change in bowel habits    3. Personal history of hyperplastic colon polyps    4. Nonsmoker    5. HTN (hypertension), benign  Comments:  cont BP medication the day of procedure    6. Controlled type 2 diabetes mellitus without complication, without long-term current use of insulin    Other orders  -     Implement Anesthesia Orders Day of Procedure; Standing  -     Follow Anesthesia Guidelines / " Protocol; Future  -     Verify bowel prep was successful; Standing    Increase in water intake  Continue fiber intake  Miralax 17 grams as needed up to twice per day    COLONOSCOPY WITH ANESTHESIA (N/A)  Body mass index is 28.41 kg/m².    Patient instructions on prep prior to procedure provided to the patient.    All risks, benefits, alternatives, and indications of colonoscopy procedure have been discussed with the patient. Risks to include perforation of the colon requiring possible surgery or colostomy, risk of bleeding from biopsies or removal of colon tissue, possibility of missing a colon polyp or cancer, or adverse drug reaction.  Benefits to include the diagnosis and management of disease of the colon and rectum. Alternatives to include barium enema, radiographic evaluation, lab testing or no intervention. Pt verbalizes understanding and agrees.     Jessica Lai, APRN  10/22/2024  13:30 CDT      IF YOU SMOKE OR USE TOBACCO PLEASE READ THE FOLLOWIN minutes reading provided    Why is smoking bad for me?  Smoking increases the risk of heart disease, lung disease, vascular disease, stroke, and cancer.     If you smoke, STOP!    If you would like more information on quitting smoking, please visit the [employer] website: [email or web address]   This link will provide additional resources including the QUIT line and the Beat the Pack support groups.     For more information:    Quit Now [place]  -QUIT-NOW  [email or web address]

## 2024-10-25 PROBLEM — Z86.0101 HX OF ADENOMATOUS COLONIC POLYPS: Status: ACTIVE | Noted: 2024-10-22

## 2024-11-13 ENCOUNTER — TRANSCRIBE ORDERS (OUTPATIENT)
Dept: ADMINISTRATIVE | Age: 64
End: 2024-11-13

## 2024-11-13 DIAGNOSIS — R22.9 LOCALIZED SUPERFICIAL SWELLING, MASS, OR LUMP: Primary | ICD-10-CM

## 2024-11-19 ENCOUNTER — HOSPITAL ENCOUNTER (OUTPATIENT)
Facility: HOSPITAL | Age: 64
Setting detail: HOSPITAL OUTPATIENT SURGERY
Discharge: HOME | End: 2024-11-19
Attending: INTERNAL MEDICINE | Admitting: INTERNAL MEDICINE
Payer: MEDICARE

## 2024-11-19 ENCOUNTER — ANESTHESIA EVENT (OUTPATIENT)
Dept: GASTROENTEROLOGY | Facility: HOSPITAL | Age: 64
End: 2024-11-19
Payer: MEDICARE

## 2024-11-19 ENCOUNTER — ANESTHESIA (OUTPATIENT)
Dept: GASTROENTEROLOGY | Facility: HOSPITAL | Age: 64
End: 2024-11-19
Payer: MEDICARE

## 2024-11-19 VITALS
RESPIRATION RATE: 20 BRPM | SYSTOLIC BLOOD PRESSURE: 151 MMHG | HEIGHT: 67 IN | TEMPERATURE: 96.5 F | HEART RATE: 67 BPM | WEIGHT: 195 LBS | BODY MASS INDEX: 30.61 KG/M2 | DIASTOLIC BLOOD PRESSURE: 64 MMHG | OXYGEN SATURATION: 95 %

## 2024-11-19 LAB — GLUCOSE BLDC GLUCOMTR-MCNC: 82 MG/DL (ref 70–130)

## 2024-11-19 PROCEDURE — G0105 COLORECTAL SCRN; HI RISK IND: HCPCS | Performed by: INTERNAL MEDICINE

## 2024-11-19 PROCEDURE — 82948 REAGENT STRIP/BLOOD GLUCOSE: CPT

## 2024-11-19 PROCEDURE — 25010000002 PROPOFOL 10 MG/ML EMULSION: Performed by: NURSE ANESTHETIST, CERTIFIED REGISTERED

## 2024-11-19 PROCEDURE — 25010000002 LIDOCAINE PF 2% 2 % SOLUTION: Performed by: NURSE ANESTHETIST, CERTIFIED REGISTERED

## 2024-11-19 RX ORDER — PROPOFOL 10 MG/ML
VIAL (ML) INTRAVENOUS AS NEEDED
Status: DISCONTINUED | OUTPATIENT
Start: 2024-11-19 | End: 2024-11-19 | Stop reason: SURG

## 2024-11-19 RX ORDER — LIDOCAINE HYDROCHLORIDE 20 MG/ML
INJECTION, SOLUTION EPIDURAL; INFILTRATION; INTRACAUDAL; PERINEURAL AS NEEDED
Status: DISCONTINUED | OUTPATIENT
Start: 2024-11-19 | End: 2024-11-19 | Stop reason: SURG

## 2024-11-19 RX ORDER — SODIUM CHLORIDE 0.9 % (FLUSH) 0.9 %
10 SYRINGE (ML) INJECTION AS NEEDED
Status: DISCONTINUED | OUTPATIENT
Start: 2024-11-19 | End: 2024-11-19 | Stop reason: HOSPADM

## 2024-11-19 RX ORDER — SODIUM CHLORIDE 9 MG/ML
500 INJECTION, SOLUTION INTRAVENOUS CONTINUOUS PRN
Status: DISCONTINUED | OUTPATIENT
Start: 2024-11-19 | End: 2024-11-19 | Stop reason: HOSPADM

## 2024-11-19 RX ORDER — LIDOCAINE HYDROCHLORIDE 10 MG/ML
0.5 INJECTION, SOLUTION EPIDURAL; INFILTRATION; INTRACAUDAL; PERINEURAL ONCE AS NEEDED
Status: DISCONTINUED | OUTPATIENT
Start: 2024-11-19 | End: 2024-11-19 | Stop reason: HOSPADM

## 2024-11-19 RX ADMIN — Medication 10 ML: at 07:36

## 2024-11-19 RX ADMIN — PROPOFOL 200 MG: 10 INJECTION, EMULSION INTRAVENOUS at 08:24

## 2024-11-19 RX ADMIN — LIDOCAINE HYDROCHLORIDE 50 MG: 20 INJECTION, SOLUTION EPIDURAL; INFILTRATION; INTRACAUDAL; PERINEURAL at 08:24

## 2024-11-19 NOTE — ANESTHESIA POSTPROCEDURE EVALUATION
"Patient: Juan Antonio Gomes    Procedure Summary       Date: 11/19/24 Room / Location: [facility] ENDOSCOPY 5 / [facility]    Anesthesia Start: 0823 Anesthesia Stop: 0844    Procedure: COLONOSCOPY WITH ANESTHESIA Diagnosis:       Hx of adenomatous colonic polyps      (Hx of adenomatous colonic polyps [Z86.0101])    Surgeons: Regulo Mandel MD Provider: Zaynab Garcia CRNA    Anesthesia Type: MAC ASA Status: 2            Anesthesia Type: MAC    Vitals  Vitals Value Taken Time   /64 11/19/24 0901   Temp     Pulse 64 11/19/24 0901   Resp 20 11/19/24 0900   SpO2 95 % 11/19/24 0901   Vitals shown include unfiled device data.        Post Anesthesia Care and Evaluation    Patient location during evaluation: floor  Patient participation: complete - patient participated  Level of consciousness: awake and alert  Pain management: satisfactory to patient    Airway patency: patent  Anesthetic complications: No anesthetic complications  PONV Status: none  Cardiovascular status: acceptable  Respiratory status: acceptable  Hydration status: acceptable  Post Neuraxial Block status: Motor and sensory function returned to baseline and No signs or symptoms of PDPH  Comments: Blood pressure 151/64, pulse 67, temperature 96.5 °F (35.8 °C), temperature source Temporal, resp. rate 20, height 170.2 cm (67\"), weight 88.5 kg (195 lb), SpO2 95%.      "

## 2024-11-19 NOTE — NURSING NOTE
Patient states he has not had his lisinopril today. Nurse stated he could take it when he got home to treat hypertension. Patient aware of his current hypertensive state and stated he would take Lisinopril when he got home.

## 2024-11-19 NOTE — ANESTHESIA PREPROCEDURE EVALUATION
Anesthesia Evaluation     no history of anesthetic complications:   NPO Solid Status: > 8 hours  NPO Liquid Status: > 2 hours           Airway   Mallampati: I  No difficulty expected  Dental      Pulmonary    (+) ,sleep apnea  Cardiovascular   Exercise tolerance: good (4-7 METS)    ECG reviewed    (+) hypertension, dysrhythmias PVC, hyperlipidemia    ROS comment: H/o bigeminy on metoprolol    Neuro/Psych  (-) seizures, TIA, CVA  GI/Hepatic/Renal/Endo    (+) obesity, diabetes mellitus type 2  (-) liver disease, no renal disease    Musculoskeletal     Abdominal    Substance History      OB/GYN          Other                      Anesthesia Plan    ASA 2     MAC     intravenous induction     Anesthetic plan, risks, benefits, and alternatives have been provided, discussed and informed consent has been obtained with: patient.    CODE STATUS:

## 2024-11-25 ENCOUNTER — TELEPHONE (OUTPATIENT)
Dept: GASTROENTEROLOGY | Facility: CLINIC | Age: 64
End: 2024-11-25
Payer: MEDICARE

## 2024-12-09 ENCOUNTER — TRANSCRIBE ORDERS (OUTPATIENT)
Dept: ADMINISTRATIVE | Facility: HOSPITAL | Age: 64
End: 2024-12-09
Payer: MEDICARE

## 2024-12-09 DIAGNOSIS — R19.05 PERIUMBILIC SWELLING, MASS OR LUMP: Primary | ICD-10-CM

## 2024-12-09 DIAGNOSIS — R19.05 ABDOMINAL SWELLING, PERIUMBILICAL REGION: ICD-10-CM

## 2024-12-11 ENCOUNTER — HOSPITAL ENCOUNTER (OUTPATIENT)
Dept: ULTRASOUND IMAGING | Facility: HOSPITAL | Age: 64
Discharge: HOME | End: 2024-12-11
Admitting: NURSE PRACTITIONER
Payer: MEDICARE

## 2024-12-11 DIAGNOSIS — R19.05 PERIUMBILIC SWELLING, MASS OR LUMP: ICD-10-CM

## 2024-12-11 PROCEDURE — 76705 ECHO EXAM OF ABDOMEN: CPT

## 2024-12-12 ENCOUNTER — HOSPITAL ENCOUNTER (OUTPATIENT)
Dept: CT IMAGING | Facility: HOSPITAL | Age: 64
Discharge: HOME | End: 2024-12-12
Admitting: NURSE PRACTITIONER
Payer: MEDICARE

## 2024-12-12 DIAGNOSIS — R19.05 ABDOMINAL SWELLING, PERIUMBILICAL REGION: ICD-10-CM

## 2024-12-12 PROCEDURE — 74176 CT ABD & PELVIS W/O CONTRAST: CPT

## 2025-04-08 ENCOUNTER — HOSPITAL ENCOUNTER (OUTPATIENT)
Dept: GENERAL RADIOLOGY | Facility: HOSPITAL | Age: 65
Discharge: HOME | End: 2025-04-08
Admitting: NURSE PRACTITIONER
Payer: MEDICARE

## 2025-04-08 ENCOUNTER — TRANSCRIBE ORDERS (OUTPATIENT)
Dept: ADMINISTRATIVE | Facility: HOSPITAL | Age: 65
End: 2025-04-08
Payer: MEDICARE

## 2025-04-08 DIAGNOSIS — R68.84 JAW PAIN: ICD-10-CM

## 2025-04-08 DIAGNOSIS — R68.84 JAW PAIN: Primary | ICD-10-CM

## 2025-04-08 PROCEDURE — 70100 X-RAY EXAM OF JAW <4VIEWS: CPT

## (undated) DEVICE — Device: Brand: DEFENDO AIR/WATER/SUCTION AND BIOPSY VALVE

## (undated) DEVICE — TBG SMPL FLTR LINE NASL 02/C02 A/ BX/100

## (undated) DEVICE — MASK,OXYGEN,MED CONC,ADLT,7' TUB, UC: Brand: PENDING

## (undated) DEVICE — CUFF,BP,DISP,1 TUBE,ADULT,HP: Brand: MEDLINE

## (undated) DEVICE — SENSR O2 OXIMAX FNGR A/ 18IN NONSTR

## (undated) DEVICE — ENDOGATOR AUXILIARY WATER JET CONNECTOR: Brand: ENDOGATOR

## (undated) DEVICE — SINGLE-USE POLYPECTOMY SNARE: Brand: CAPTIVATOR II

## (undated) DEVICE — THE CHANNEL CLEANING BRUSH IS A NYLON FLEXI BRUSH ATTACHED TO A FLEXIBLE PLASTIC SHEATH DESIGNED TO SAFELY REMOVE DEBRIS FROM FLEXIBLE ENDOSCOPES.

## (undated) DEVICE — THE SINGLE USE ETRAP – POLYP TRAP IS USED FOR SUCTION RETRIEVAL OF ENDOSCOPICALLY REMOVED POLYPS.: Brand: ETRAP

## (undated) DEVICE — YANKAUER,BULB TIP WITH VENT: Brand: ARGYLE